# Patient Record
Sex: MALE | Race: OTHER
[De-identification: names, ages, dates, MRNs, and addresses within clinical notes are randomized per-mention and may not be internally consistent; named-entity substitution may affect disease eponyms.]

---

## 2017-09-05 ENCOUNTER — HOSPITAL ENCOUNTER (OUTPATIENT)
Dept: HOSPITAL 61 - NM | Age: 67
Discharge: HOME | End: 2017-09-05
Attending: INTERNAL MEDICINE
Payer: MEDICARE

## 2017-09-05 DIAGNOSIS — R07.9: Primary | ICD-10-CM

## 2017-09-05 PROCEDURE — A9500 TC99M SESTAMIBI: HCPCS

## 2017-09-05 PROCEDURE — 78452 HT MUSCLE IMAGE SPECT MULT: CPT

## 2017-09-05 PROCEDURE — 96374 THER/PROPH/DIAG INJ IV PUSH: CPT

## 2017-09-05 PROCEDURE — 93017 CV STRESS TEST TRACING ONLY: CPT

## 2017-09-05 PROCEDURE — 96376 TX/PRO/DX INJ SAME DRUG ADON: CPT

## 2017-09-05 PROCEDURE — 96375 TX/PRO/DX INJ NEW DRUG ADDON: CPT

## 2017-09-05 NOTE — RAD
--------------- APPROVED REPORT --------------





Test Type:          Pharmacological

Stress Nurse/Tech: Deana Pantoja RN

Test Indications: chest pain

Cardiac History: see ehr

Medications:     see ehr

Medical History: see ehr

Resting ECG:     SB

Resting Heart Rate: 53 bpm

Resting Blood Pressure: 150/79mmHg

Pretest Chest Pain: None



Nurse/Tech Notes

Lungs CTA, S1,S2

Consent: The procedure was explained to the patient in lay terms. Informed consent was witnessed. Steven
eout was entered into [employer]. History and Stress Test performed by ANNIE NazarioNKady



Pharm. Details

Pharmacologic stress testing was performed using 0.4mg per 5ml of regadenoson given intravenously ove
r 7-10 seconds.



Stress Symptoms

No chest pain or symptoms.



POST EXERCISE

Reason for Termination: Infusion complete

Max HR: 91 bpm

Max Blood Pressure: 157/80mmHg

Blood Pressure response to exercise: Normal blood pressure response during stress.

Chest Pain: No. 

Arrhythmia: No. 



INTERPRETATION

Stress EKG Conclusion: Baseline EKG showed sinus rhythm with PAC's.  No ischemic changes at peak stre
ss.  No arrhythmias.



Imaging Protocol

IMAGE PROTOCOL: Rest Tc-99m/stress Tc-99m 1 day



Rest:            Stress:         Viability:   

Radiopharm.Tc99m [id number] Sestamibi

Dose13.1mCi            34.1mCi            

Duration    15min.           10min.           

Img Date  09/05/2017 09/05/2017      

Inj-Img [id number].           60min.           



Rest Admin Site:IV - Left AntecubitalAdministrator:MYRIAM Hatch

Stress Admin Site: IV - Left AntecubitalAdministrator: [place] Canseco, ARRT (R)(N)



STRESS DATA

End Diast. Vol.124.0mlAv. Heart Rate61.0bpm

End Syst. Vol.41.0mlCO Index BSA0.0L/min

Myocardial Mciq890.0gEject. Jkpusahy83.0%



Stress Rates

Pk. Fill Rate2.35EDV/secLVtime Pk. Fill 291.23msec

Pk. Empty Rate3.23ESV/secLVtime Pk. Wamro471.23msec

1/3 Pk. Fill0.99EDV/sec



Stress Scores

Regional WT2.00Summed WT13.00

Regional WM0.00Summed WM1.00



Study quality was good.  Left Ventricular size was Normal at Rest and Stress.

Lung uptake was Normal.  Left Ventricular ejection fraction is 67%.

The rest and stress images show normal perfusion, normal contraction and thickening.



LV Perf. Quant

17 Seg. SSS0.00

17 Seg. SRS2.00

17 Seg. SDS0.00

Stress Defect Extent (% LAD)0.00Rest Defect Extent (% LAD)0.00Rev. Defect Extent (% LAD)0.00

Stress Defect Extent (% LCX) 0.00Rest Defect Extent (% LCX)6.30Rev. Defect Extent (% LCX)0.00

Stress Defect Extent (% RCA)0.00Rest Defect Extent (% RCA)0.00Rev. Defect Extent (% RCA)0.00

Stress Defect Extent (% ROLANDO)0.00Rest Defect Extent (% ROLANDO)1.50Rev. Defect Extent (% ROLANDO)0.00



Conclusion

1. Regadenoson cardioisotope stress test did not show any evidence of ischemia or infarct.

2. Normal left ventricular systolic function with ejection fraction calculated at 67%.

3. Low risk for cardiac events.

## 2021-02-03 ENCOUNTER — HOSPITAL ENCOUNTER (OUTPATIENT)
Dept: HOSPITAL 61 - KCIC | Age: 71
End: 2021-02-03
Attending: FAMILY MEDICINE
Payer: COMMERCIAL

## 2021-02-03 DIAGNOSIS — R07.89: Primary | ICD-10-CM

## 2021-02-03 PROCEDURE — 71046 X-RAY EXAM CHEST 2 VIEWS: CPT

## 2021-02-03 NOTE — [facility]
Exam: Chest 2 views



INDICATION: COPD, chest wall pain



TECHNIQUE: Frontal and lateral views the chest



Comparisons: None



FINDINGS:

The cardiomediastinal silhouette and pulmonary vessels are within normal limits.



The lung and pleural spaces are clear.



IMPRESSION:

No acute cardiopulmonary process.



Electronically signed by: Jareth Hauser MD (2/3/2021 7:51 PM) SIMONE

## 2022-03-01 ENCOUNTER — HOSPITAL ENCOUNTER (OUTPATIENT)
Dept: HOSPITAL 61 - MRI | Age: 72
End: 2022-03-01
Attending: FAMILY MEDICINE
Payer: COMMERCIAL

## 2022-03-01 DIAGNOSIS — M25.761: ICD-10-CM

## 2022-03-01 DIAGNOSIS — M25.861: ICD-10-CM

## 2022-03-01 DIAGNOSIS — M25.461: ICD-10-CM

## 2022-03-01 DIAGNOSIS — M17.11: Primary | ICD-10-CM

## 2022-03-01 PROCEDURE — 73562 X-RAY EXAM OF KNEE 3: CPT

## 2022-03-01 NOTE — RAD
Exam: XR KNEE 3 VIEWS_RT



History: Right knee pain



Comparison: None.



Findings:

Osseous mineralization is normal. No acute fracture or dislocaton. Tricompartmental osteophytes. Narr
owing of the medial tibiofemoral compartment. Subchondral sclerosis and lucencies. Small suprapatella
r effusion. No focal soft tissue swelling. Atherosclerotic calcifications of the vasculature. Dystrop
hic calcifications in the popliteal fossa.



Impression: 

1.  Right knee osteoarthritis without acute osseous abnormality.



Electronically signed by: Edward Cobb MD (3/1/2022 4:59 PM) [id number]